# Patient Record
Sex: FEMALE
[De-identification: names, ages, dates, MRNs, and addresses within clinical notes are randomized per-mention and may not be internally consistent; named-entity substitution may affect disease eponyms.]

---

## 2021-07-18 ENCOUNTER — NURSE TRIAGE (OUTPATIENT)
Dept: OTHER | Facility: CLINIC | Age: 41
End: 2021-07-18

## 2021-07-18 NOTE — TELEPHONE ENCOUNTER
Reason for Disposition   [1] SEVERE pain (e.g., excruciating) AND [2] not improved 2 hours after pain medicine/ice packs    Answer Assessment - Initial Assessment Questions  1. MECHANISM: \"How did the injury happen? \" (Consider the possibility of domestic violence or elder abuse)      It happened when she put her dog on the lead and dog pulled her - she braced herself and that is when the pain started . Pt did not fall. 2. ONSET: \"When did the injury happen? \" (Minutes or hours ago)      2 days ago     3. LOCATION: \"What part of the back is injured? \"      Lower left side - radiates down leg over last couple days and today from hip to thigh on outside    4. SEVERITY: \"Can you move the back normally? \"      She can move it, but she has to watch how she sits and stands. She can bear weight on it , but when she does, the pain increases. 5. PAIN: \"Is there any pain? \" If so, ask: \"How bad is the pain? \"   (Scale 1-10; or mild, moderate, severe)      8/10 , especially if she stands. She says her pain is severe. 6. CORD SYMPTOMS: Any weakness or numbness of the arms or legs? \"      Denies    7. SIZE: For cuts, bruises, or swelling, ask: \"How large is it? \" (e.g., inches or centimeters)      Denies any     8. TETANUS: For any breaks in the skin, ask: \"When was the last tetanus booster? \"      No open wounds    9. OTHER SYMPTOMS: \"Do you have any other symptoms? \" (e.g., abdominal pain, blood in urine)      Denies. She states she has tried warm and cold compresses and also had Diclofenac potassium and methocarb, but they only decrease symptoms temporarily . 10. PREGNANCY: \"Is there any chance you are pregnant? \" \"When was your last menstrual period? \"        denies    Protocols used: BACK INJURY-ADULT-AH    Brief description of triage: back injury    Triage indicates for patient to be seen within 4 hours - pt states she'll go to .     Care advice provided, patient verbalizes understanding; denies any other questions or concerns; instructed to call back for any new or worsening symptoms. Attention Provider: Thank you for allowing me to participate in the care of your patient. The patient was connected to triage in response to symptoms provided. Please do not respond through this encounter as the response is not directed to a shared pool.